# Patient Record
Sex: FEMALE | Race: WHITE | Employment: UNEMPLOYED | URBAN - METROPOLITAN AREA
[De-identification: names, ages, dates, MRNs, and addresses within clinical notes are randomized per-mention and may not be internally consistent; named-entity substitution may affect disease eponyms.]

---

## 2021-04-27 ENCOUNTER — HOSPITAL ENCOUNTER (OUTPATIENT)
Age: 51
Discharge: HOME | End: 2021-04-27
Payer: COMMERCIAL

## 2021-04-27 LAB
A/G RATIO: 0.9 (ref 1.1–2.2)
ALBUMIN SERPL-MCNC: 3.3 G/DL (ref 3.4–5)
ALP BLD-CCNC: 181 U/L (ref 40–129)
ALT SERPL-CCNC: 10 U/L (ref 10–40)
AMMONIA: 13 UMOL/L (ref 11–51)
ANION GAP SERPL CALCULATED.3IONS-SCNC: 14 MMOL/L (ref 3–16)
AST SERPL-CCNC: 21 U/L (ref 15–37)
BASOPHILS ABSOLUTE: 0.1 K/UL (ref 0–0.2)
BASOPHILS RELATIVE PERCENT: 1.2 %
BILIRUB SERPL-MCNC: 0.8 MG/DL (ref 0–1)
BILIRUBIN URINE: NEGATIVE
BLOOD, URINE: NEGATIVE
BUN BLDV-MCNC: 4 MG/DL (ref 7–20)
C DIFF TOXIN/ANTIGEN: NORMAL
CALCIUM SERPL-MCNC: 8.9 MG/DL (ref 8.3–10.6)
CHLORIDE BLD-SCNC: 97 MMOL/L (ref 99–110)
CHOLESTEROL, TOTAL: 160 MG/DL (ref 0–199)
CLARITY: ABNORMAL
CO2: 26 MMOL/L (ref 21–32)
COLOR: YELLOW
CREAT SERPL-MCNC: <0.5 MG/DL (ref 0.6–1.1)
EOSINOPHILS ABSOLUTE: 0.1 K/UL (ref 0–0.6)
EOSINOPHILS RELATIVE PERCENT: 1.2 %
EPITHELIAL CELLS, UA: NORMAL /HPF (ref 0–5)
FOLATE: >20 NG/ML (ref 4.78–24.2)
FREE THYROXINE INDEX: 5.7 UG/DL (ref 4.4–11.4)
GFR AFRICAN AMERICAN: >60
GFR NON-AFRICAN AMERICAN: >60
GLOBULIN: 3.8 G/DL
GLUCOSE BLD-MCNC: 90 MG/DL (ref 70–99)
GLUCOSE URINE: NEGATIVE MG/DL
HCT VFR BLD CALC: 40 % (ref 36–48)
HDLC SERPL-MCNC: 98 MG/DL (ref 40–60)
HEMOGLOBIN: 13.3 G/DL (ref 12–16)
IRON SATURATION: 30 % (ref 15–50)
IRON: 106 UG/DL (ref 37–145)
KETONES, URINE: NEGATIVE MG/DL
LDL CHOLESTEROL CALCULATED: 52 MG/DL
LEUKOCYTE ESTERASE, URINE: NEGATIVE
LYMPHOCYTES ABSOLUTE: 1.4 K/UL (ref 1–5.1)
LYMPHOCYTES RELATIVE PERCENT: 12.4 %
MCH RBC QN AUTO: 31.6 PG (ref 26–34)
MCHC RBC AUTO-ENTMCNC: 33.3 G/DL (ref 31–36)
MCV RBC AUTO: 95 FL (ref 80–100)
MICROSCOPIC EXAMINATION: YES
MONOCYTES ABSOLUTE: 0.9 K/UL (ref 0–1.3)
MONOCYTES RELATIVE PERCENT: 7.5 %
NEUTROPHILS ABSOLUTE: 9 K/UL (ref 1.7–7.7)
NEUTROPHILS RELATIVE PERCENT: 77.7 %
NITRITE, URINE: NEGATIVE
PDW BLD-RTO: 14.7 % (ref 12.4–15.4)
PH UA: 6.5 (ref 5–8)
PLATELET # BLD: 286 K/UL (ref 135–450)
PMV BLD AUTO: 9.1 FL (ref 5–10.5)
POTASSIUM SERPL-SCNC: 3.6 MMOL/L (ref 3.5–5.1)
PROTEIN UA: NEGATIVE MG/DL
RBC # BLD: 4.21 M/UL (ref 4–5.2)
RBC UA: NORMAL /HPF (ref 0–4)
SODIUM BLD-SCNC: 137 MMOL/L (ref 136–145)
SPECIFIC GRAVITY UA: 1.01 (ref 1–1.03)
T3 TOTAL: 1.5 NG/ML (ref 0.8–2)
T3 UPTAKE PERCENT: 1.1 TBI (ref 0.8–1.3)
T4 FREE: 1.1 NG/DL (ref 0.9–1.8)
T4 TOTAL: 6.3 UG/DL (ref 4.5–10.9)
TOTAL IRON BINDING CAPACITY: 358 UG/DL (ref 260–445)
TOTAL PROTEIN: 7.1 G/DL (ref 6.4–8.2)
TRIGL SERPL-MCNC: 51 MG/DL (ref 0–150)
TSH SERPL DL<=0.05 MIU/L-ACNC: 2.07 UIU/ML (ref 0.27–4.2)
URINE TYPE: ABNORMAL
UROBILINOGEN, URINE: 1 E.U./DL
VITAMIN B-12: 476 PG/ML (ref 211–911)
VITAMIN D 25-HYDROXY: 44.9 NG/ML
VLDLC SERPL CALC-MCNC: 10 MG/DL
WBC # BLD: 11.6 K/UL (ref 4–11)
WBC UA: NORMAL /HPF (ref 0–5)

## 2021-04-27 PROCEDURE — 82607 VITAMIN B-12: CPT

## 2021-04-27 PROCEDURE — 84443 ASSAY THYROID STIM HORMONE: CPT

## 2021-04-27 PROCEDURE — 87449 NOS EACH ORGANISM AG IA: CPT

## 2021-04-27 PROCEDURE — 83550 IRON BINDING TEST: CPT

## 2021-04-27 PROCEDURE — 36415 COLL VENOUS BLD VENIPUNCTURE: CPT

## 2021-04-27 PROCEDURE — 87324 CLOSTRIDIUM AG IA: CPT

## 2021-04-27 PROCEDURE — 82746 ASSAY OF FOLIC ACID SERUM: CPT

## 2021-04-27 PROCEDURE — 83540 ASSAY OF IRON: CPT

## 2021-04-27 PROCEDURE — 82140 ASSAY OF AMMONIA: CPT

## 2021-04-27 PROCEDURE — 84439 ASSAY OF FREE THYROXINE: CPT

## 2021-04-27 PROCEDURE — 80053 COMPREHEN METABOLIC PANEL: CPT

## 2021-04-27 PROCEDURE — 81001 URINALYSIS AUTO W/SCOPE: CPT

## 2021-04-27 PROCEDURE — 82306 VITAMIN D 25 HYDROXY: CPT

## 2021-04-27 PROCEDURE — 84480 ASSAY TRIIODOTHYRONINE (T3): CPT

## 2021-04-27 PROCEDURE — 85025 COMPLETE CBC W/AUTO DIFF WBC: CPT

## 2021-04-27 PROCEDURE — 80061 LIPID PANEL: CPT

## 2021-04-30 ENCOUNTER — OFFICE VISIT (OUTPATIENT)
Dept: CARDIOLOGY CLINIC | Age: 51
End: 2021-04-30
Payer: COMMERCIAL

## 2021-04-30 VITALS
DIASTOLIC BLOOD PRESSURE: 60 MMHG | HEIGHT: 64 IN | OXYGEN SATURATION: 97 % | RESPIRATION RATE: 16 BRPM | WEIGHT: 97 LBS | BODY MASS INDEX: 16.56 KG/M2 | HEART RATE: 88 BPM | SYSTOLIC BLOOD PRESSURE: 104 MMHG

## 2021-04-30 DIAGNOSIS — R07.89 OTHER CHEST PAIN: ICD-10-CM

## 2021-04-30 DIAGNOSIS — Z72.0 TOBACCO ABUSE: ICD-10-CM

## 2021-04-30 PROCEDURE — G8427 DOCREV CUR MEDS BY ELIG CLIN: HCPCS | Performed by: INTERNAL MEDICINE

## 2021-04-30 PROCEDURE — 99204 OFFICE O/P NEW MOD 45 MIN: CPT | Performed by: INTERNAL MEDICINE

## 2021-04-30 PROCEDURE — G8419 CALC BMI OUT NRM PARAM NOF/U: HCPCS | Performed by: INTERNAL MEDICINE

## 2021-04-30 PROCEDURE — 3017F COLORECTAL CA SCREEN DOC REV: CPT | Performed by: INTERNAL MEDICINE

## 2021-04-30 PROCEDURE — 4004F PT TOBACCO SCREEN RCVD TLK: CPT | Performed by: INTERNAL MEDICINE

## 2021-04-30 RX ORDER — SPIRONOLACTONE 25 MG/1
25 TABLET ORAL DAILY
COMMUNITY
Start: 2021-03-13

## 2021-04-30 RX ORDER — FUROSEMIDE 20 MG/1
TABLET ORAL
COMMUNITY
Start: 2021-03-12

## 2021-04-30 RX ORDER — MIDODRINE HYDROCHLORIDE 2.5 MG/1
7.5 TABLET ORAL 3 TIMES DAILY
COMMUNITY

## 2021-04-30 NOTE — PROGRESS NOTES
tablet Take 100 mg by mouth daily   Yes Historical Provider, MD   Vancomycin HCl (FIRST-VANCOMYCIN) 25 MG/ML SOLN Take by mouth   Yes Historical Provider, MD   potassium chloride (KLOR-CON M) 20 MEQ extended release tablet Take 1 tablet by mouth 2 times daily 6/42/38  Yes KAILEY Goldman CNP        Allergies:  Flexeril [cyclobenzaprine]     Review of Systems:   · Constitutional: there has been no unanticipated weight loss. There's been no change in energy level, sleep pattern, or activity level. · Eyes: No visual changes or diplopia. No scleral icterus. · ENT: No Headaches, hearing loss or vertigo. No mouth sores or sore throat. · Cardiovascular: Reviewed in HPI  · Respiratory: + cough or wheezing, no sputum production. No hematemesis. · Gastrointestinal: No abdominal pain, appetite loss, blood in stools. No change in bowel or bladder habits. · Genitourinary: No dysuria, trouble voiding, or hematuria. · Musculoskeletal:  No gait disturbance, weakness or joint complaints. · Integumentary: No rash or pruritis. · Neurological: + headache, diplopia, change in muscle strength, numbness or tingling. No change in gait, balance, coordination, mood, affect, memory, mentation, behavior. · Psychiatric: No anxiety, no depression. · Endocrine: No malaise, fatigue or temperature intolerance. No excessive thirst, fluid intake, or urination. No tremor. · Hematologic/Lymphatic: No abnormal bruising or bleeding, blood clots or swollen lymph nodes. · Allergic/Immunologic: No nasal congestion or hives.     Physical Examination:    Vitals:    04/30/21 1439   BP: 104/60   Pulse: 88   Resp: 16   SpO2: 97%        Wt Readings from Last 1 Encounters:   04/30/21 97 lb (44 kg)       Constitutional and General Appearance:appears chronically ill with marked generalized muscle wasting   Respiratory:  · Normal excursion and expansion without use of accessory muscles  · Resp Auscultation: Normal breath sounds without dullness Cardiovascular:chest wall very tender to palpation  · The apical impulses not displaced  · Heart tones are crisp and normal  · Cervical veins are not engorged  · The carotid upstroke is normal in amplitude and contour without delay or bruit  · Peripheral pulses are symmetrical and full  · There is no clubbing, cyanosis of the extremities. · No edema  · Femoral Arteries: 2+ and equal  · Pedal Pulses: 2+ and equal   Abdomen:  · No masses or tenderness  · Liver/Spleen: No Abnormalities Noted  Neurological/Psychiatric:  · Alert and oriented in all spheres  · Moves all extremities well  · Exhibits normal gait balance and coordination  · No abnormalities of mood, affect, memory, mentation, or behavior are noted      Assessment/Plan:    1. Other chest pain - bilateral chest pain, EKG>normal findings   2. Tobacco abuse - discussed smoking cessation   3. Malnutrition - discussed increasing protein in her diet  4. Alcoholism -  States she quit drinking with recent hospital admission. Offered encouragement that will be best thing she can do for health. Offered that there was no evidence she had a stroke and presentation more c/w metabolic encephalopathy. Also with extravascular fluid accumulation due to extremely low plasma oncotic pressure but can do echo to see if there is LV dysfunction. If cardiomyopathy likely nutritional and can hopefully improve with alcohol cessation    PLAN: Echo and OV in 2 weeks. I appreciate the opportunity of cooperating in the care of this individual.    Leeanne Douglas. Tung Woods M.D., [address] attestation: This note was scribed in the presence of Dr Tung Woods MD by Estefany Koehler RN. The scribe's documentation has been prepared under my direction and personally reviewed by me in its entirety. I confirm that the note above accurately reflects all work, treatment, procedures, and medical decision making performed by me.

## 2021-05-13 NOTE — PROGRESS NOTES
[address]   Cardiac Consultation    Referring Provider:  Carlos Au DO     Chief Complaint   Patient presents with    Follow-up     2 week        History of Present Illness:    Ms Toney Palacio is seen today as a follow up to recent hospitalization. She states she was told in the hospital she had heart failure and a \"mini stroke\". Eugene Abu states she was numb from her hips down and could not walk. She was admitted with acute encephalopathy. Her history includes RA, alcoholism and smoking. She states she was sent to a nursing home after hospitalization. Upon admission she was hypokalemic with elevated liver enzymes. CT of her brain did not reveal any acute findings. She was hypotensive so Midodrine added. She was anemic and transfused. CT abdomen done, then paracentesis done 3/8/21. Extremely low albumin    Ms Toney Palacio reports she has not had any alcohol since her hospitalization. She has no chest pain,change in exertional shortness of breath palpitations or dizziness. Today an echo was unremarkable    Patient is compliant  with medication and is tolerating them well without side effects    Past Medical History:   has a past medical history of Rheumatoid arthritis ([facility] [address] Surgical History:   has a past surgical history that includes Tubal ligation and Dilation and curettage of uterus (N/A, 7/7/2014). Social History:   reports that she has been smoking. She has been smoking about 1.00 pack per day. She has never used smokeless tobacco. She reports current alcohol use. She reports that she does not use drugs. Heavy EtOH use in past but none in 2 months    Family History:  family history is not on file. Home Medications:  Prior to Admission medications    Medication Sig Start Date End Date Taking?  Authorizing Provider   midodrine (PROAMATINE) 2.5 MG tablet Take 7.5 mg by mouth 3 times daily    Yes Historical Provider, MD   furosemide (LASIX) 20 MG tablet Lasix 20 mg tablet   Take 1 tablet twice a day by oral route. 3/12/21   Historical Provider, MD   rifaximin (XIFAXAN) 550 MG tablet Xifaxan 550 mg tablet   Take 1 tablet twice a day by oral route. Historical Provider, MD   spironolactone (ALDACTONE) 25 MG tablet Take 25 mg by mouth daily 3/13/21   Historical Provider, MD   thiamine 50 MG tablet Take 100 mg by mouth daily    Historical Provider, MD   Vancomycin HCl (FIRST-VANCOMYCIN) 25 MG/ML SOLN Take by mouth    Historical Provider, MD   potassium chloride (KLOR-CON M) 20 MEQ extended release tablet Take 1 tablet by mouth 2 times daily  Patient not taking: Reported on 0/48/1834 9/97/70   Shelby Bass, APRN - CNP        Allergies:  Flexeril [cyclobenzaprine]     Review of Systems:   · Constitutional: there has been no unanticipated weight loss. There's been no change in energy level, sleep pattern, or activity level. · Eyes: No visual changes or diplopia. No scleral icterus. · ENT: No Headaches, hearing loss or vertigo. No mouth sores or sore throat. · Cardiovascular: Reviewed in HPI  · Respiratory: + cough or wheezing, no sputum production. No hematemesis. · Gastrointestinal: No abdominal pain, appetite loss, blood in stools. No change in bowel or bladder habits. · Genitourinary: No dysuria, trouble voiding, or hematuria. · Musculoskeletal:  No gait disturbance, weakness or joint complaints. · Integumentary: No rash or pruritis. · Neurological: + headache, diplopia, change in muscle strength, numbness or tingling. No change in gait, balance, coordination, mood, affect, memory, mentation, behavior. · Psychiatric: No anxiety, no depression. · Endocrine: No malaise, fatigue or temperature intolerance. No excessive thirst, fluid intake, or urination. No tremor. · Hematologic/Lymphatic: No abnormal bruising or bleeding, blood clots or swollen lymph nodes. · Allergic/Immunologic: No nasal congestion or hives.     Physical Examination:    Vitals:    05/14/21 1432   BP: (!) 106/58 Pulse: 86   Resp: 18   SpO2: 98%        Wt Readings from Last 1 Encounters:   05/14/21 100 lb (45.4 kg)       Constitutional and General Appearance:appears chronically ill with marked generalized muscle wasting   Respiratory:  · Normal excursion and expansion without use of accessory muscles  · Resp Auscultation: Normal breath sounds without dullness  Cardiovascular:chest wall very tender to palpation  · The apical impulses not displaced  · Heart tones are crisp and normal  · Cervical veins are not engorged  · The carotid upstroke is normal in amplitude and contour without delay or bruit  · Peripheral pulses are symmetrical and full  · There is no clubbing, cyanosis of the extremities. · No edema  · Femoral Arteries: 2+ and equal  · Pedal Pulses: 2+ and equal   Abdomen:  · No masses or tenderness  · Liver/Spleen: No Abnormalities Noted  Neurological/Psychiatric:  · Alert and oriented in all spheres  · Moves all extremities well  · Exhibits normal gait balance and coordination  · No abnormalities of mood, affect, memory, mentation, or behavior are noted    Normal left ventricle size, wall thickness and systolic function with an   estimated ejection fraction of 60%. No regional wall motion abnormalities   are seen. E/e\"= 7. Diastolic filling parameters suggests normal diastolic function. Trivial mitral regurgitation. Trivial tricuspid regurgitation. PASP 17mmHg. Trivial pulmonic regurgitation. IVC size is normal (<2.1cm) and collapses > 50% with respiration consistent   with normal RA pressure (3mmHg). Assessment/Plan:    1. Other chest pain - bilateral chest pain, EKG>normal findings   2. Tobacco abuse - discussed smoking cessation   3. Malnutrition - discussed increasing protein in her diet  4. Alcoholism -  States she quit drinking with recent hospital admission. Offered encouragement that will be best thing she can do for health. Echo reviewed today, heart is very strong.  No signs of chf,no signs of a heart attack or stroke    PLAN:   Follow up prn  Avoid alcohol      I appreciate the opportunity of cooperating in the care of this individual.    Pedroophilus Hatchet E. Tauna Coke, M.D., [address] attestation: This note was scribed in the presence of Dr Trista Bejarano MD by Edward Iyer RN. The scribe's documentation has been prepared under my direction and personally reviewed by me in its entirety. I confirm that the note above accurately reflects all work, treatment, procedures, and medical decision making performed by me.

## 2021-05-14 ENCOUNTER — OFFICE VISIT (OUTPATIENT)
Dept: CARDIOLOGY CLINIC | Age: 51
End: 2021-05-14
Payer: COMMERCIAL

## 2021-05-14 ENCOUNTER — PROCEDURE VISIT (OUTPATIENT)
Dept: CARDIOLOGY CLINIC | Age: 51
End: 2021-05-14
Payer: COMMERCIAL

## 2021-05-14 VITALS
WEIGHT: 100 LBS | RESPIRATION RATE: 18 BRPM | HEIGHT: 64 IN | DIASTOLIC BLOOD PRESSURE: 58 MMHG | HEART RATE: 86 BPM | BODY MASS INDEX: 17.07 KG/M2 | OXYGEN SATURATION: 98 % | SYSTOLIC BLOOD PRESSURE: 106 MMHG

## 2021-05-14 DIAGNOSIS — F10.10 ALCOHOL ABUSE: ICD-10-CM

## 2021-05-14 DIAGNOSIS — Z72.0 TOBACCO ABUSE: ICD-10-CM

## 2021-05-14 DIAGNOSIS — R06.02 SHORTNESS OF BREATH: Primary | ICD-10-CM

## 2021-05-14 DIAGNOSIS — R07.89 OTHER CHEST PAIN: ICD-10-CM

## 2021-05-14 LAB
LV EF: 60 %
LVEF MODALITY: NORMAL

## 2021-05-14 PROCEDURE — 3017F COLORECTAL CA SCREEN DOC REV: CPT | Performed by: INTERNAL MEDICINE

## 2021-05-14 PROCEDURE — G8427 DOCREV CUR MEDS BY ELIG CLIN: HCPCS | Performed by: INTERNAL MEDICINE

## 2021-05-14 PROCEDURE — 99214 OFFICE O/P EST MOD 30 MIN: CPT | Performed by: INTERNAL MEDICINE

## 2021-05-14 PROCEDURE — 4004F PT TOBACCO SCREEN RCVD TLK: CPT | Performed by: INTERNAL MEDICINE

## 2021-05-14 PROCEDURE — G8419 CALC BMI OUT NRM PARAM NOF/U: HCPCS | Performed by: INTERNAL MEDICINE

## 2021-05-14 PROCEDURE — 93306 TTE W/DOPPLER COMPLETE: CPT | Performed by: INTERNAL MEDICINE

## 2023-10-15 ENCOUNTER — OFFICE VISIT (OUTPATIENT)
Age: 53
End: 2023-10-15

## 2023-10-15 VITALS
WEIGHT: 108.6 LBS | DIASTOLIC BLOOD PRESSURE: 84 MMHG | SYSTOLIC BLOOD PRESSURE: 125 MMHG | HEART RATE: 88 BPM | BODY MASS INDEX: 18.09 KG/M2 | OXYGEN SATURATION: 98 % | HEIGHT: 65 IN | TEMPERATURE: 98 F

## 2023-10-15 DIAGNOSIS — K04.7 DENTAL ABSCESS: Primary | ICD-10-CM

## 2023-10-15 RX ORDER — AMOXICILLIN AND CLAVULANATE POTASSIUM 875; 125 MG/1; MG/1
1 TABLET, FILM COATED ORAL 2 TIMES DAILY
Qty: 20 TABLET | Refills: 0 | Status: SHIPPED | OUTPATIENT
Start: 2023-10-15 | End: 2023-10-25